# Patient Record
Sex: MALE | Race: WHITE | Employment: UNEMPLOYED | URBAN - METROPOLITAN AREA
[De-identification: names, ages, dates, MRNs, and addresses within clinical notes are randomized per-mention and may not be internally consistent; named-entity substitution may affect disease eponyms.]

---

## 2019-04-24 ENCOUNTER — HOSPITAL ENCOUNTER (EMERGENCY)
Age: 29
Discharge: HOME | End: 2019-04-24
Payer: MEDICAID

## 2019-04-24 VITALS
WEIGHT: 200 LBS | HEIGHT: 72 IN | OXYGEN SATURATION: 96 % | DIASTOLIC BLOOD PRESSURE: 86 MMHG | RESPIRATION RATE: 16 BRPM | HEART RATE: 93 BPM | SYSTOLIC BLOOD PRESSURE: 142 MMHG | TEMPERATURE: 97.5 F | BODY MASS INDEX: 27.09 KG/M2

## 2019-04-24 DIAGNOSIS — R11.2 NON-INTRACTABLE VOMITING WITH NAUSEA, UNSPECIFIED VOMITING TYPE: Primary | ICD-10-CM

## 2019-04-24 LAB
ALBUMIN SERPL-MCNC: 4.6 G/DL (ref 3.5–5.2)
ALP BLD-CCNC: 64 U/L (ref 40–129)
ALT SERPL-CCNC: 18 U/L (ref 0–40)
ANION GAP SERPL CALCULATED.3IONS-SCNC: 10 MMOL/L (ref 7–16)
AST SERPL-CCNC: 16 U/L (ref 0–39)
BACTERIA: ABNORMAL /HPF
BASOPHILS ABSOLUTE: 0.03 E9/L (ref 0–0.2)
BASOPHILS RELATIVE PERCENT: 0.3 % (ref 0–2)
BILIRUB SERPL-MCNC: 1.7 MG/DL (ref 0–1.2)
BILIRUBIN URINE: ABNORMAL
BLOOD, URINE: NEGATIVE
BUN BLDV-MCNC: 14 MG/DL (ref 6–20)
CALCIUM SERPL-MCNC: 9.4 MG/DL (ref 8.6–10.2)
CHLORIDE BLD-SCNC: 99 MMOL/L (ref 98–107)
CLARITY: CLEAR
CO2: 30 MMOL/L (ref 22–29)
COLOR: YELLOW
CREAT SERPL-MCNC: 1 MG/DL (ref 0.7–1.2)
EOSINOPHILS ABSOLUTE: 0.11 E9/L (ref 0.05–0.5)
EOSINOPHILS RELATIVE PERCENT: 1.2 % (ref 0–6)
GFR AFRICAN AMERICAN: >60
GFR NON-AFRICAN AMERICAN: >60 ML/MIN/1.73
GLUCOSE BLD-MCNC: 107 MG/DL (ref 74–99)
GLUCOSE URINE: NEGATIVE MG/DL
HCT VFR BLD CALC: 42.7 % (ref 37–54)
HEMOGLOBIN: 14.2 G/DL (ref 12.5–16.5)
IMMATURE GRANULOCYTES #: 0.03 E9/L
IMMATURE GRANULOCYTES %: 0.3 % (ref 0–5)
KETONES, URINE: ABNORMAL MG/DL
LACTIC ACID: 2.1 MMOL/L (ref 0.5–2.2)
LEUKOCYTE ESTERASE, URINE: NEGATIVE
LIPASE: 16 U/L (ref 13–60)
LYMPHOCYTES ABSOLUTE: 3.9 E9/L (ref 1.5–4)
LYMPHOCYTES RELATIVE PERCENT: 42.8 % (ref 20–42)
MCH RBC QN AUTO: 28.6 PG (ref 26–35)
MCHC RBC AUTO-ENTMCNC: 33.3 % (ref 32–34.5)
MCV RBC AUTO: 86.1 FL (ref 80–99.9)
MONOCYTES ABSOLUTE: 0.76 E9/L (ref 0.1–0.95)
MONOCYTES RELATIVE PERCENT: 8.3 % (ref 2–12)
NEUTROPHILS ABSOLUTE: 4.28 E9/L (ref 1.8–7.3)
NEUTROPHILS RELATIVE PERCENT: 47.1 % (ref 43–80)
NITRITE, URINE: POSITIVE
PDW BLD-RTO: 13.4 FL (ref 11.5–15)
PH UA: 6 (ref 5–9)
PLATELET # BLD: 249 E9/L (ref 130–450)
PMV BLD AUTO: 10.4 FL (ref 7–12)
POTASSIUM SERPL-SCNC: 3.3 MMOL/L (ref 3.5–5)
PROTEIN UA: 30 MG/DL
RBC # BLD: 4.96 E12/L (ref 3.8–5.8)
RBC UA: ABNORMAL /HPF (ref 0–2)
SODIUM BLD-SCNC: 139 MMOL/L (ref 132–146)
SPECIFIC GRAVITY UA: 1.02 (ref 1–1.03)
TOTAL PROTEIN: 7.7 G/DL (ref 6.4–8.3)
UROBILINOGEN, URINE: 1 E.U./DL
WBC # BLD: 9.1 E9/L (ref 4.5–11.5)
WBC UA: ABNORMAL /HPF (ref 0–5)

## 2019-04-24 PROCEDURE — 96374 THER/PROPH/DIAG INJ IV PUSH: CPT

## 2019-04-24 PROCEDURE — 2580000003 HC RX 258: Performed by: PHYSICIAN ASSISTANT

## 2019-04-24 PROCEDURE — 83690 ASSAY OF LIPASE: CPT

## 2019-04-24 PROCEDURE — 80053 COMPREHEN METABOLIC PANEL: CPT

## 2019-04-24 PROCEDURE — 81001 URINALYSIS AUTO W/SCOPE: CPT

## 2019-04-24 PROCEDURE — 85025 COMPLETE CBC W/AUTO DIFF WBC: CPT

## 2019-04-24 PROCEDURE — 6360000002 HC RX W HCPCS: Performed by: PHYSICIAN ASSISTANT

## 2019-04-24 PROCEDURE — 36415 COLL VENOUS BLD VENIPUNCTURE: CPT

## 2019-04-24 PROCEDURE — 83605 ASSAY OF LACTIC ACID: CPT

## 2019-04-24 PROCEDURE — 99283 EMERGENCY DEPT VISIT LOW MDM: CPT

## 2019-04-24 RX ORDER — 0.9 % SODIUM CHLORIDE 0.9 %
1000 INTRAVENOUS SOLUTION INTRAVENOUS ONCE
Status: COMPLETED | OUTPATIENT
Start: 2019-04-24 | End: 2019-04-24

## 2019-04-24 RX ORDER — ONDANSETRON 2 MG/ML
4 INJECTION INTRAMUSCULAR; INTRAVENOUS ONCE
Status: COMPLETED | OUTPATIENT
Start: 2019-04-24 | End: 2019-04-24

## 2019-04-24 RX ORDER — ONDANSETRON 4 MG/1
4 TABLET, ORALLY DISINTEGRATING ORAL EVERY 8 HOURS PRN
Qty: 10 TABLET | Refills: 0 | Status: SHIPPED | OUTPATIENT
Start: 2019-04-24

## 2019-04-24 RX ADMIN — SODIUM CHLORIDE 1000 ML: 9 INJECTION, SOLUTION INTRAVENOUS at 13:24

## 2019-04-24 RX ADMIN — ONDANSETRON 4 MG: 2 INJECTION INTRAMUSCULAR; INTRAVENOUS at 13:24

## 2019-04-25 NOTE — ED PROVIDER NOTES
[facility]     Department of Emergency Medicine   ED  Provider Note  Admit Date/RoomTime: 4/24/2019  1:08 PM  ED Room: 31/31  Chief Complaint   Nausea (since Saturday) and Abdominal Pain (LUQ pain that travels up to neck)    History of Present Illness   Source of history provided by:  patient. History/Exam Limitations: none. Kip Patricio is a 29 y.o. old male who has a past medical history of: History reviewed. No pertinent past medical history. presents to the emergency department by private vehicle and ambulatory, for complaints of still present nausea, vomiting & diarrhea which began several day(s) prior to arrival.  There has been no similar episodes in the past. He states: no travel, recent antibiotics, tainted food, contact with contagious person, history of GI disease, new medications or change in diet. Stool quality described as semisolid and watery. The symptoms are associated with anorexia. Symptoms are aggravated by eating and liquids and relieved by nothing. There has been no additional symptoms of fever, chills, sweats, headache, irritability, URI symptoms or cough. ROS    Pertinent positives and negatives are stated within HPI, all other systems reviewed and are negative. Past Surgical History:   Procedure Laterality Date    TONSILLECTOMY     Social History:  reports that he has been smoking. He has never used smokeless tobacco. He reports that he drinks alcohol. He reports that he has current or past drug history. Drug: Marijuana. Family History: family history is not on file. Allergies: Patient has no known allergies.     Physical Exam            ED Triage Vitals   BP Temp Temp Source Pulse Resp SpO2 Height Weight   04/24/19 1226 04/24/19 1226 04/24/19 1226 04/24/19 1217 04/24/19 1226 04/24/19 1217 04/24/19 1226 04/24/19 1226   (!) 142/86 97.5 °F (36.4 °C) Temporal 108 16 98 % 6' (1.829 m) 200 lb (90.7 kg)      Oxygen Saturation Interpretation: Normal.    Constitutional: Alert, development consistent with age. HEENT:  NC/NT. Airway patent. Neck:  Normal ROM. Supple. Respiratory:  Clear to auscultation and breath sounds equal.  CV:  Regular rate and rhythm, normal heart sounds, without pathological murmurs, ectopy, gallops, or rubs. GI:  General Appearance: normal.       Bowel sounds: normal bowel sounds. Distension:  None. Tenderness: No abdominal tenderness. Liver: non-tender. Spleen:  non-tender. Pulsatile Mass: absent. Hernia:  no inguinal or femoral hernias noted. Rectal: (chaperone present during examination). not indicated / deferred. Back: CVA Tenderness: No.  Integument:  Normal turgor. Warm, dry, without visible rash, unless noted elsewhere. Lymphatics: No lymphangitis or adenopathy noted. Neurological:  Oriented. Motor functions intact.     Lab / Imaging Results   (All laboratory and radiology results have been personally reviewed by myself)  Labs:  Results for orders placed or performed during the hospital encounter of 04/24/19   CBC Auto Differential   Result Value Ref Range    WBC 9.1 4.5 - 11.5 E9/L    RBC 4.96 3.80 - 5.80 E12/L    Hemoglobin 14.2 12.5 - 16.5 g/dL    Hematocrit 42.7 37.0 - 54.0 %    MCV 86.1 80.0 - 99.9 fL    MCH 28.6 26.0 - 35.0 pg    MCHC 33.3 32.0 - 34.5 %    RDW 13.4 11.5 - 15.0 fL    Platelets 277 204 - 300 E9/L    MPV 10.4 7.0 - 12.0 fL    Neutrophils % 47.1 43.0 - 80.0 %    Immature Granulocytes % 0.3 0.0 - 5.0 %    Lymphocytes % 42.8 (H) 20.0 - 42.0 %    Monocytes % 8.3 2.0 - 12.0 %    Eosinophils % 1.2 0.0 - 6.0 %    Basophils % 0.3 0.0 - 2.0 %    Neutrophils # 4.28 1.80 - 7.30 E9/L    Immature Granulocytes # 0.03 E9/L    Lymphocytes # 3.90 1.50 - 4.00 E9/L    Monocytes # 0.76 0.10 - 0.95 E9/L    Eosinophils # 0.11 0.05 - 0.50 E9/L    Basophils # 0.03 0.00 - 0.20 E9/L   Comprehensive Metabolic Panel   Result Value Ref Range    Sodium 139 132 - 146 mmol/L Potassium 3.3 (L) 3.5 - 5.0 mmol/L    Chloride 99 98 - 107 mmol/L    CO2 30 (H) 22 - 29 mmol/L    Anion Gap 10 7 - 16 mmol/L    Glucose 107 (H) 74 - 99 mg/dL    BUN 14 6 - 20 mg/dL    CREATININE 1.0 0.7 - 1.2 mg/dL    GFR Non-African American >60 >=60 mL/min/1.73    GFR African American >60     Calcium 9.4 8.6 - 10.2 mg/dL    Total Protein 7.7 6.4 - 8.3 g/dL    Alb 4.6 3.5 - 5.2 g/dL    Total Bilirubin 1.7 (H) 0.0 - 1.2 mg/dL    Alkaline Phosphatase 64 40 - 129 U/L    ALT 18 0 - 40 U/L    AST 16 0 - 39 U/L   Lactic Acid, Plasma   Result Value Ref Range    Lactic Acid 2.1 0.5 - 2.2 mmol/L   Lipase   Result Value Ref Range    Lipase 16 13 - 60 U/L   Urinalysis   Result Value Ref Range    Color, UA Yellow Straw/Yellow    Clarity, UA Clear Clear    Glucose, Ur Negative Negative mg/dL    Bilirubin Urine MODERATE (A) Negative    Ketones, Urine TRACE (A) Negative mg/dL    Specific Gravity, UA 1.020 1.005 - 1.030    Blood, Urine Negative Negative    pH, UA 6.0 5.0 - 9.0    Protein, UA 30 (A) Negative mg/dL    Urobilinogen, Urine 1.0 <2.0 E.U./dL    Nitrite, Urine POSITIVE (A) Negative    Leukocyte Esterase, Urine Negative Negative   Microscopic Urinalysis   Result Value Ref Range    WBC, UA 2-5 0 - 5 /HPF    RBC, UA NONE 0 - 2 /HPF    Bacteria, UA MODERATE (A) /HPF     Imaging: All Radiology results interpreted by Radiologist unless otherwise noted. No orders to display     ED Course / Medical Decision Making     Medications   0.9 % sodium chloride bolus (0 mLs Intravenous Stopped 4/24/19 1400)   ondansetron (ZOFRAN) injection 4 mg (4 mg Intravenous Given 4/24/19 1324)        Re-examination:  4/24/19       Time:  5187   Patient states his symptoms are improving. States he has an appointment at 2 pm and needs to leave. Discussed results with the patient. Consult(s):   none. Procedure(s):   none    MDM:   Patient in no acute distress. Vital signs stable. Labwork obtained. Patient denies any urinary symptoms.  Labwork stable. 100 with IV fluid. States he feels improved. Discussed results with the patient. States he has an appointment in 10 minutes. Like to be discharged at this time. Discharged with Zofran. Patient will follow with PCP. Educated on the signs and symptoms to return to the ED. Discharged in stable condition. Counseling: The emergency provider has spoken with the patient and discussed todays results, in addition to providing specific details for the plan of care and counseling regarding the diagnosis and prognosis. Questions are answered at this time and they are agreeable with the plan. Assessment     1. Non-intractable vomiting with nausea, unspecified vomiting type      Plan   Discharge to home  Patient condition is good    New Medications     Discharge Medication List as of 4/24/2019  1:53 PM      START taking these medications    Details   ondansetron (ZOFRAN ODT) 4 MG disintegrating tablet Take 1 tablet by mouth every 8 hours as needed for Nausea or Vomiting, Disp-10 tablet, R-0Print           Electronically signed by TARA Devlin   DD: 4/25/19  **This report was transcribed using voice recognition software. Every effort was made to ensure accuracy; however, inadvertent computerized transcription errors may be present.   END OF ED PROVIDER NOTE       Trang Devlni  04/25/19 1543

## 2021-03-01 ENCOUNTER — HOSPITAL ENCOUNTER (EMERGENCY)
Age: 31
Discharge: HOME | End: 2021-03-01
Attending: EMERGENCY MEDICINE

## 2021-03-01 ENCOUNTER — APPOINTMENT (OUTPATIENT)
Dept: GENERAL RADIOLOGY | Age: 31
End: 2021-03-01

## 2021-03-01 VITALS
BODY MASS INDEX: 26.6 KG/M2 | OXYGEN SATURATION: 98 % | WEIGHT: 190 LBS | TEMPERATURE: 97.9 F | DIASTOLIC BLOOD PRESSURE: 65 MMHG | HEIGHT: 71 IN | RESPIRATION RATE: 16 BRPM | SYSTOLIC BLOOD PRESSURE: 113 MMHG | HEART RATE: 79 BPM

## 2021-03-01 DIAGNOSIS — S30.0XXA CONTUSION OF SACRUM, INITIAL ENCOUNTER: Primary | ICD-10-CM

## 2021-03-01 DIAGNOSIS — R60.9 DEPENDENT EDEMA: ICD-10-CM
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
## 2021-03-01 PROCEDURE — 72170 X-RAY EXAM OF PELVIS: CPT

## 2021-03-01 PROCEDURE — 99282 EMERGENCY DEPT VISIT SF MDM: CPT

## 2021-03-01 PROCEDURE — 72220 X-RAY EXAM SACRUM TAILBONE: CPT

## 2021-03-01 RX ORDER — IBUPROFEN 800 MG/1
800 TABLET ORAL ONCE
Status: DISCONTINUED | OUTPATIENT
Start: 2021-03-01 | End: 2021-03-01 | Stop reason: HOSPADM

## 2021-03-01 NOTE — ED PROVIDER NOTES
ED Attending  CC: Oralia Merchant [facility]  Department of Emergency Medicine   ED  Encounter Note  Admit Date/RoomTime: 3/1/2021  2:00 PM  ED Room: 35/35    NAME: Shelby Rodriguez  : 1990  MRN: [id number]     Chief Complaint:  Fall (fell last week and injured tailbone) and Foot Swelling (bilateral foot and ankle swelling)    HISTORY OF PRESENT ILLNESS        Shelby Rodriguez is a 27 y.o. male who presents to the ED by private vehicle for complaint of bilateral ankle swelling for the past 2 days. He denies any injury or pain to the area. Patient states he wanted to be checked to make sure he was not having heart failure because his father sent him to the ED out of concern. He denies any chest pain, fever, chills, cough, shortness of breath, wheezing or any other symptoms. Patient states he sleeps in a recliner daily and has not been sleeping in a bed for several days. Patient states he did fall last Tuesday while walking down steps and slipping on ice even on his legs and has discomfort in the sacral, coccyx region. He denies any loss of bladder or bowel function. Denies any rectal numbness or paresthesia of lower extremities. Patient denied any neck pain or head injury. Patient was ambulatory on arrival with no assistive device and had a steady gait. The complaint has been stable and persistent and are mild in severity. Pain in the coccyx region is exacerbated by certain movements or positions. Patient denies any recent travel, recent weight gain, recent history of surgery. Patient denies any Covid 19 symptoms or previous history of having the disease. ROS   Pertinent positives and negatives are stated within HPI, all other systems reviewed and are negative. Past Medical History:  has no past medical history on file. Surgical History:  has a past surgical history that includes Tonsillectomy. Social History:  reports that he has been smoking.  He has never used smokeless tobacco. He reports current alcohol use. He reports current drug use. Drug: Marijuana. Family History: family history is not on file. Allergies: Patient has no known allergies. PHYSICAL EXAM   Oxygen Saturation Interpretation: Normal.        ED Triage Vitals [03/01/21 1401]   BP Temp Temp Source Pulse Resp SpO2 Height Weight   113/65 97.9 °F (36.6 °C) Oral 79 16 98 % 5' 11\" (1.803 m) 190 lb (86.2 kg)         Physical Exam  Constitutional/General: Alert and oriented x3, well appearing, non toxic. HEENT:  NC/NT. PERRLA,  Airway patent. Atraumatic no raccoons or horton sign noted. Neck: Supple, full ROM, non tender to palpation in the midline, no stridor, no crepitus, no meningeal signs  Respiratory: Lungs clear to auscultation bilaterally, no wheezes, rales, or rhonchi. Not in respiratory distress  CV:  Regular rate. Regular rhythm. No murmurs, gallops, or rubs. 2+ distal pulses  Chest: No chest wall tenderness  GI:  Abdomen Soft, Non tender, Non distended. +BS. No rebound, guarding, or rigidity. No pulsatile masses. Musculoskeletal: Moves all extremities x 4. Warm and well perfused, no clubbing, cyanosis, or edema of the leg. Capillary refill <3 seconds. Negative Homans' sign bilaterally. Patient has very mild ankle dependent edema bilaterally. Tenderness noted over the coccyx region with no erythema, no swelling or rashes. Full sensation intact to light touch in bilateral lower extremities. Normal lower extremity reflexes intact. Integument: skin warm and dry. No rashes. Lymphatic: no lymphadenopathy noted  Neurologic: GCS 15, no focal deficits, symmetric strength 5/5 in the upper and lower extremities bilaterally. Cranial nerves II through XII grossly intact. Psychiatric: Normal Affect      Lab / Imaging Results   (All laboratory and radiology results have been personally reviewed by myself)  Labs:  No results found for this visit on 03/01/21. Imaging:   All Radiology results interpreted by Radiologist unless otherwise noted. XR SACRUM COCCYX (MIN 2 VIEWS)   Final Result   Limited examination without convincing evidence of acute fracture. XR PELVIS (1-2 VIEWS)   Final Result   Limited examination without convincing evidence of acute fracture. ED Course / Medical Decision Making     Medications - No data to display  ED Course as of Mar 01 2351   Mon Mar 01, 2021   1425 Dr. Jo Stauffer into examine patient and discussed dependent edema and to elevated feet and will obtain xrays of pelvis and sacrum.     [SE]      ED Course User Index  [SE] SHANTELLE Lamb - CNP     Re-Evaluations:  3/1/21      Time: [id number] discussed results with patient. Patients condition remains stable. Consultations:             None    Procedures:   none    MDM: This is a 55-year-old male patient who presents today with complaints of bilateral ankle swelling and has no pain or difficulty ambulating. Patient has no signs of DVT or PE. Patient was concerned for CHF and has oxygen saturation above 98% on room air lungs are clear to auscultation and has no coughing or wheezing. Patient is afebrile, nontoxic in appearance, hemodynamically stable. PERC score is 0. Patient has no cardiac history. Patient has a history of having a fall and will obtain x-rays to rule out any sacral or pelvic fracture. Patient was given Motrin and refused the medication did not want any pain medication. X-rays were negative for any acute fractures. Advised patient on signs and symptoms warranting immediate return to the ED for reevaluation at any time. At this time patient was instructed on low-sodium diet and to elevate his legs above the level of his heart and that he should be in a supine position when sleeping and to avoid the reclining chair. Patient advised on follow-up with his PCP for reevaluation.     Plan of Care/Counseling:  I reviewed today's visit with the patient in addition to providing specific details for the plan of care and counseling regarding the diagnosis and prognosis. Questions are answered at this time and are agreeable with the plan. ASSESSMENT     1. Contusion of sacrum, initial encounter    2. Dependent edema    3. Fall due to slipping on ice or snow, initial encounter      This patient's ED course included: a personal history and physicial examination, re-evaluation prior to disposition and complex medical decision making and emergency management  This patient has remained hemodynamically stable and been closely monitored during their ED course. PLAN   Discharge home. Patient condition is good. New Medications     Discharge Medication List as of 3/1/2021  3:37 PM        Electronically signed by SHANTELLE Reed CNP   DD: 3/1/21  **This report was transcribed using voice recognition software. Every effort was made to ensure accuracy; however, inadvertent computerized transcription errors may be present.   END OF PROVIDER NOTE      SHANTELLE Reed CNP  03/01/21 6235